# Patient Record
Sex: FEMALE | Race: WHITE | NOT HISPANIC OR LATINO | Employment: UNEMPLOYED | URBAN - METROPOLITAN AREA
[De-identification: names, ages, dates, MRNs, and addresses within clinical notes are randomized per-mention and may not be internally consistent; named-entity substitution may affect disease eponyms.]

---

## 2024-09-11 ENCOUNTER — HOSPITAL ENCOUNTER (EMERGENCY)
Facility: HOSPITAL | Age: 29
Discharge: HOME | End: 2024-09-11
Attending: EMERGENCY MEDICINE | Admitting: EMERGENCY MEDICINE
Payer: COMMERCIAL

## 2024-09-11 VITALS
TEMPERATURE: 98 F | DIASTOLIC BLOOD PRESSURE: 70 MMHG | HEART RATE: 88 BPM | SYSTOLIC BLOOD PRESSURE: 137 MMHG | OXYGEN SATURATION: 97 % | RESPIRATION RATE: 18 BRPM

## 2024-09-11 DIAGNOSIS — J06.9 VIRAL URI: Primary | ICD-10-CM

## 2024-09-11 PROCEDURE — 87804 INFLUENZA ASSAY W/OPTIC: CPT | Performed by: EMERGENCY MEDICINE

## 2024-09-11 PROCEDURE — 99283 EMERGENCY DEPT VISIT LOW MDM: CPT

## 2024-09-11 PROCEDURE — 99284 EMERGENCY DEPT VISIT MOD MDM: CPT | Performed by: EMERGENCY MEDICINE

## 2024-09-11 PROCEDURE — 87811 SARS-COV-2 COVID19 W/OPTIC: CPT | Performed by: EMERGENCY MEDICINE

## 2024-09-11 RX ORDER — IBUPROFEN 400 MG/1
800 TABLET, FILM COATED ORAL ONCE
Status: COMPLETED | OUTPATIENT
Start: 2024-09-11 | End: 2024-09-11

## 2024-09-11 RX ADMIN — IBUPROFEN 800 MG: 400 TABLET, FILM COATED ORAL at 23:42

## 2024-09-12 LAB
FLUAV AG UPPER RESP QL IA.RAPID: NEGATIVE
FLUBV AG UPPER RESP QL IA.RAPID: NEGATIVE
SARS-COV+SARS-COV-2 AG RESP QL IA.RAPID: NEGATIVE

## 2024-09-12 NOTE — ED PROVIDER NOTES
1. Viral URI      ED Disposition       ED Disposition   Discharge    Condition   Stable    Date/Time   Wed Sep 11, 2024 11:47 PM    Comment   Alena Ewing discharge to home/self care.                   Assessment & Plan       Medical Decision Making  Patient is a 21-year-old female presents for evaluation of COVID test.  Patient does not want any medication of that ibuprofen.  Patient clinically well satting in the high 90s.  Advise supportive measures for COVID.    Amount and/or Complexity of Data Reviewed  Labs: ordered.    Risk  Prescription drug management.                       Medications   ibuprofen (MOTRIN) tablet 800 mg (800 mg Oral Given 9/11/24 8016)       History of Present Illness       Patient is a 29-year-old female who presents for evaluation of COVID test.  Mom had COVID recently and patient started with symptoms yesterday.  She endorses a mild dull achy headache along with some rhinorrhea.  No significant cough.  No nausea vomiting chest pain dyspnea abdominal pain.  She has not take anything for her symptoms.            Review of Systems   Constitutional:  Negative for fever.   HENT:  Positive for rhinorrhea. Negative for sore throat.    Respiratory:  Negative for shortness of breath.    Cardiovascular:  Negative for chest pain.   Gastrointestinal:  Negative for abdominal pain.   Genitourinary:  Negative for dysuria.   Musculoskeletal:  Negative for back pain.   Skin:  Negative for rash.   Neurological:  Positive for headaches. Negative for light-headedness.   Psychiatric/Behavioral:  Negative for agitation.    All other systems reviewed and are negative.          Objective     ED Triage Vitals [09/11/24 2336]   Temperature Pulse Blood Pressure Respirations SpO2 Patient Position - Orthostatic VS   98 °F (36.7 °C) 88 137/70 18 97 % --      Temp src Heart Rate Source BP Location FiO2 (%) Pain Score    -- Monitor -- -- 3        Physical Exam  Vitals reviewed.   Constitutional:       General: She is  not in acute distress.     Appearance: She is well-developed.   HENT:      Head: Normocephalic.   Eyes:      Pupils: Pupils are equal, round, and reactive to light.   Cardiovascular:      Rate and Rhythm: Normal rate and regular rhythm.      Heart sounds: Normal heart sounds.   Pulmonary:      Effort: Pulmonary effort is normal.      Breath sounds: Normal breath sounds.   Abdominal:      General: Bowel sounds are normal. There is no distension.      Palpations: Abdomen is soft.      Tenderness: There is no abdominal tenderness. There is no guarding.   Musculoskeletal:         General: No tenderness or deformity. Normal range of motion.      Cervical back: Normal range of motion and neck supple.   Skin:     General: Skin is warm and dry.      Capillary Refill: Capillary refill takes less than 2 seconds.   Neurological:      Mental Status: She is alert and oriented to person, place, and time.      Cranial Nerves: No cranial nerve deficit.      Sensory: No sensory deficit.   Psychiatric:         Behavior: Behavior normal.         Thought Content: Thought content normal.         Judgment: Judgment normal.         Labs Reviewed   COVID-19/INFLUENZA A/B RAPID ANTIGEN (30 MIN.TAT)     No orders to display       Procedures       Mundo Zepeda MD  09/12/24 0004

## 2024-11-14 ENCOUNTER — HOSPITAL ENCOUNTER (EMERGENCY)
Facility: HOSPITAL | Age: 29
Discharge: HOME | End: 2024-11-14
Attending: EMERGENCY MEDICINE | Admitting: EMERGENCY MEDICINE
Payer: COMMERCIAL

## 2024-11-14 VITALS
SYSTOLIC BLOOD PRESSURE: 153 MMHG | BODY MASS INDEX: 36.1 KG/M2 | HEART RATE: 101 BPM | DIASTOLIC BLOOD PRESSURE: 87 MMHG | OXYGEN SATURATION: 98 % | WEIGHT: 224.6 LBS | RESPIRATION RATE: 18 BRPM | TEMPERATURE: 97.8 F | HEIGHT: 66 IN

## 2024-11-14 DIAGNOSIS — J32.9 SINUSITIS: Primary | ICD-10-CM

## 2024-11-14 DIAGNOSIS — J02.9 PHARYNGITIS: ICD-10-CM

## 2024-11-14 PROCEDURE — 99282 EMERGENCY DEPT VISIT SF MDM: CPT

## 2024-11-14 PROCEDURE — 99284 EMERGENCY DEPT VISIT MOD MDM: CPT | Performed by: EMERGENCY MEDICINE

## 2024-11-14 RX ORDER — ALBUTEROL SULFATE 90 UG/1
2 INHALANT RESPIRATORY (INHALATION) ONCE
Status: COMPLETED | OUTPATIENT
Start: 2024-11-14 | End: 2024-11-14

## 2024-11-14 RX ORDER — PSEUDOEPHEDRINE HCL 120 MG/1
120 TABLET, FILM COATED, EXTENDED RELEASE ORAL EVERY 12 HOURS PRN
Qty: 12 TABLET | Refills: 0 | Status: SHIPPED | OUTPATIENT
Start: 2024-11-14

## 2024-11-14 RX ADMIN — ALBUTEROL SULFATE 2 PUFF: 90 AEROSOL, METERED RESPIRATORY (INHALATION) at 09:14

## 2024-11-14 NOTE — ED PROVIDER NOTES
Time reflects when diagnosis was documented in both MDM as applicable and the Disposition within this note       Time User Action Codes Description Comment    11/14/2024  9:10 AM Coco Zelaya Add [J32.9] Sinusitis     11/14/2024  9:10 AM Coco Zelaya Add [J02.9] Pharyngitis           ED Disposition       ED Disposition   Discharge    Condition   Stable    Date/Time   Thu Nov 14, 2024  9:11 AM    Comment   Alena Ewing discharge to home/self care.                   Assessment & Plan       Medical Decision Making  29-year-old female presents to the ED with runny nose, congestion for the past 1 month.  Discussed that majority of sinus infections are viral however given the duration of her symptoms, will try a course of Augmentin.  Will prescribe Sudafed to be used as needed for nasal congestion.  I did offer giving first dose of medications in the ED however she has not eaten anything this morning and would prefer to eat something prior to taking the medication so we will send to pharmacy.  Will provide an inhaler for patient to go home with.  Lungs currently clear and she denies feeling short of breath or having chest tightness currently.  Advised her to use the inhaler as needed and to return immediately if any of her symptoms worsen especially her asthma.    Risk  OTC drugs.  Prescription drug management.             Medications   albuterol (PROVENTIL HFA,VENTOLIN HFA) inhaler 2 puff (2 puffs Inhalation Given 11/14/24 0914)       ED Risk Strat Scores                                               History of Present Illness       Chief Complaint   Patient presents with    Nasal Drainage    Sore Throat     Runny nose and congestion for the past month, began with sore throat 2 days ago. Seen at urgent care yesterday.        Past Medical History:   Diagnosis Date    Asthma     Disease of thyroid gland     GERD (gastroesophageal reflux disease)       History reviewed. No pertinent surgical history.   History  reviewed. No pertinent family history.   Social History     Tobacco Use    Smoking status: Never    Smokeless tobacco: Never   Vaping Use    Vaping status: Every Day   Substance Use Topics    Alcohol use: Not Currently    Drug use: No      E-Cigarette/Vaping    E-Cigarette Use Current Every Day User       E-Cigarette/Vaping Substances    Nicotine No     THC No     CBD No     Flavoring No     Other No     Unknown No       I have reviewed and agree with the history as documented.     Patient is a 29-year-old female with past medical history of hypothyroidism, asthma, presents to the emergency department for runny nose and congestion for the past 1 month.  Patient states she has been sick for 1 month with upper respiratory symptoms.  She states she alternates between rhinorrhea of yellow or white mucus and nasal congestion.  She has been using over-the-counter congestion medications with minimal relief.  She states yesterday her throat was hurting however after drinking tea with honey and lemon, it is improved.  She was also using throat lozenges.  She reports minimal dry cough and intermittent chest tightness but denies any shortness of breath or wheezing.  She denies any fevers or shaking chills.  She does report frontal headache that feels similar to sinus headaches.  She denies any earache, neck pain or stiffness, difficulty swallowing or handling secretions, chest pain, palpitations, abdominal pain, nausea, vomiting, diarrhea, urinary symptoms, skin rash or color change, focal neurologic deficits.  She states she was at urgent care yesterday and they swabbed her for COVID and flu which was negative.  She was prescribed promethazine however patient was not happy with the care she received from urgent care.      History provided by:  Patient   used: No    Sore Throat  Associated symptoms: cough, headaches and rhinorrhea    Associated symptoms: no abdominal pain, no adenopathy, no chest pain, no  chills, no ear discharge, no ear pain, no epistaxis, no fever, no neck stiffness, no rash, no shortness of breath, no trouble swallowing and no voice change        Review of Systems   Constitutional:  Negative for chills and fever.   HENT:  Positive for congestion, rhinorrhea, sinus pressure and sore throat. Negative for ear discharge, ear pain, nosebleeds, trouble swallowing and voice change.    Eyes:  Negative for photophobia, pain and visual disturbance.   Respiratory:  Positive for cough. Negative for chest tightness, shortness of breath and wheezing.    Cardiovascular:  Negative for chest pain and palpitations.   Gastrointestinal:  Negative for abdominal pain, diarrhea, nausea and vomiting.   Genitourinary:  Negative for dysuria, flank pain, frequency and hematuria.   Musculoskeletal:  Negative for back pain, neck pain and neck stiffness.   Skin:  Negative for color change, pallor, rash and wound.   Allergic/Immunologic: Negative for immunocompromised state.   Neurological:  Positive for headaches. Negative for dizziness, syncope, weakness, light-headedness and numbness.   Hematological:  Negative for adenopathy.   Psychiatric/Behavioral:  Negative for confusion and decreased concentration.    All other systems reviewed and are negative.          Objective       ED Triage Vitals [11/14/24 0850]   Temperature Pulse Blood Pressure Respirations SpO2 Patient Position - Orthostatic VS   97.8 °F (36.6 °C) 101 153/87 18 98 % Sitting      Temp Source Heart Rate Source BP Location FiO2 (%) Pain Score    Oral Monitor Left arm -- --      Vitals      Date and Time Temp Pulse SpO2 Resp BP Pain Score FACES Pain Rating User   11/14/24 0850 97.8 °F (36.6 °C) 101 98 % 18 153/87 -- -- LA            Physical Exam  Vitals and nursing note reviewed.   Constitutional:       General: She is not in acute distress.     Appearance: Normal appearance. She is well-developed. She is not ill-appearing, toxic-appearing or diaphoretic.    HENT:      Head: Normocephalic and atraumatic.      Right Ear: Ear canal and external ear normal. There is no impacted cerumen.      Left Ear: Ear canal and external ear normal. There is no impacted cerumen.      Ears:      Comments: Bilateral ear effusion.  No TM erythema or bulging.     Nose: Congestion present.      Comments: Bilateral nasal turbinate erythema and edema.     Mouth/Throat:      Mouth: Mucous membranes are moist.      Pharynx: Oropharynx is clear. Posterior oropharyngeal erythema present. No oropharyngeal exudate.   Eyes:      Extraocular Movements: Extraocular movements intact.      Conjunctiva/sclera: Conjunctivae normal.      Pupils: Pupils are equal, round, and reactive to light.   Neck:      Vascular: No JVD.   Cardiovascular:      Rate and Rhythm: Normal rate and regular rhythm.      Pulses: Normal pulses.      Heart sounds: Normal heart sounds. No murmur heard.     No friction rub. No gallop.   Pulmonary:      Effort: Pulmonary effort is normal. No respiratory distress.      Breath sounds: Normal breath sounds. No wheezing, rhonchi or rales.   Abdominal:      General: There is no distension.      Palpations: Abdomen is soft.      Tenderness: There is no abdominal tenderness. There is no guarding or rebound.   Musculoskeletal:         General: No swelling or tenderness. Normal range of motion.      Cervical back: Normal range of motion and neck supple. No rigidity.   Lymphadenopathy:      Cervical: No cervical adenopathy.   Skin:     General: Skin is warm and dry.      Coloration: Skin is not pale.      Findings: No erythema or rash.   Neurological:      General: No focal deficit present.      Mental Status: She is alert and oriented to person, place, and time.      Sensory: No sensory deficit.      Motor: No weakness.   Psychiatric:         Behavior: Behavior normal.         Results Reviewed       None            No orders to display       Procedures    ED Medication and Procedure  Management   Prior to Admission Medications   Prescriptions Last Dose Informant Patient Reported? Taking?   albuterol (PROVENTIL HFA,VENTOLIN HFA) 90 mcg/act inhaler   No No   Sig: Inhale 2 puffs 4 (four) times a day as needed for wheezing   benzonatate (TESSALON) 200 MG capsule   No No   Sig: Take 1 capsule (200 mg total) by mouth 3 (three) times a day as needed for cough   Patient not taking: Reported on 5/16/2024   ibuprofen (MOTRIN) 600 mg tablet   No No   Sig: Take 1 tablet (600 mg total) by mouth every 6 (six) hours as needed for mild pain or moderate pain   levothyroxine 50 mcg tablet   Yes No   Sig: Take 50 mcg by mouth daily   methylPREDNISolone 4 MG tablet therapy pack   No No   Sig: Use as directed on package   Patient not taking: Reported on 5/16/2024   metroNIDAZOLE (METROGEL) 0.75 % vaginal gel   Yes No   Sig: INSERT 1 APPLICATORFUL INTO THE VAGINA 2 TIMES A DAY FOR 5 DAYS.   Patient not taking: Reported on 5/16/2024      Facility-Administered Medications: None     Discharge Medication List as of 11/14/2024  9:13 AM        START taking these medications    Details   amoxicillin-clavulanate (AUGMENTIN) 875-125 mg per tablet Take 1 tablet by mouth every 12 (twelve) hours for 7 days, Starting Thu 11/14/2024, Until Thu 11/21/2024, Normal      pseudoephedrine (SUDAFED) 120 MG 12 hr tablet Take 1 tablet (120 mg total) by mouth every 12 (twelve) hours as needed for congestion, Starting Thu 11/14/2024, Normal           CONTINUE these medications which have NOT CHANGED    Details   albuterol (PROVENTIL HFA,VENTOLIN HFA) 90 mcg/act inhaler Inhale 2 puffs 4 (four) times a day as needed for wheezing, Starting Sun 1/28/2024, Normal      benzonatate (TESSALON) 200 MG capsule Take 1 capsule (200 mg total) by mouth 3 (three) times a day as needed for cough, Starting Mon 9/18/2023, Normal      ibuprofen (MOTRIN) 600 mg tablet Take 1 tablet (600 mg total) by mouth every 6 (six) hours as needed for mild pain or moderate  pain, Starting Mon 3/25/2024, Normal      levothyroxine 50 mcg tablet Take 50 mcg by mouth daily, Starting Thu 4/13/2023, Until Thu 5/16/2024, Historical Med      methylPREDNISolone 4 MG tablet therapy pack Use as directed on package, Normal      metroNIDAZOLE (METROGEL) 0.75 % vaginal gel INSERT 1 APPLICATORFUL INTO THE VAGINA 2 TIMES A DAY FOR 5 DAYS., Historical Med           No discharge procedures on file.  ED SEPSIS DOCUMENTATION   Time reflects when diagnosis was documented in both MDM as applicable and the Disposition within this note       Time User Action Codes Description Comment    11/14/2024  9:10 AM Coco Zelaya [J32.9] Sinusitis     11/14/2024  9:10 AM Coco Zelaya [J02.9] Pharyngitis                  Coco Zelaya DO  11/14/24 0919

## 2025-06-12 ENCOUNTER — TELEPHONE (OUTPATIENT)
Dept: FAMILY MEDICINE CLINIC | Facility: CLINIC | Age: 30
End: 2025-06-12

## 2025-06-12 NOTE — TELEPHONE ENCOUNTER
Please remove PCP from pt's chart because pt is now going to [facility]. Thank You.

## 2025-06-18 ENCOUNTER — APPOINTMENT (EMERGENCY)
Dept: CT IMAGING | Facility: HOSPITAL | Age: 30
End: 2025-06-18
Payer: COMMERCIAL

## 2025-06-18 ENCOUNTER — HOSPITAL ENCOUNTER (EMERGENCY)
Facility: HOSPITAL | Age: 30
Discharge: HOME | End: 2025-06-18
Attending: EMERGENCY MEDICINE
Payer: COMMERCIAL

## 2025-06-18 VITALS
WEIGHT: 206 LBS | HEIGHT: 66 IN | DIASTOLIC BLOOD PRESSURE: 82 MMHG | OXYGEN SATURATION: 100 % | SYSTOLIC BLOOD PRESSURE: 138 MMHG | TEMPERATURE: 97.9 F | RESPIRATION RATE: 18 BRPM | BODY MASS INDEX: 33.11 KG/M2 | HEART RATE: 99 BPM

## 2025-06-18 DIAGNOSIS — R42 DIZZINESS: Primary | ICD-10-CM

## 2025-06-18 LAB
ANION GAP SERPL CALCULATED.3IONS-SCNC: 7 MMOL/L (ref 4–13)
BASOPHILS # BLD AUTO: 0.05 THOUSANDS/ÂΜL (ref 0–0.1)
BASOPHILS NFR BLD AUTO: 1 % (ref 0–1)
BUN SERPL-MCNC: 10 MG/DL (ref 5–25)
CALCIUM SERPL-MCNC: 9.5 MG/DL (ref 8.4–10.2)
CHLORIDE SERPL-SCNC: 105 MMOL/L (ref 96–108)
CO2 SERPL-SCNC: 25 MMOL/L (ref 21–32)
CREAT SERPL-MCNC: 0.65 MG/DL (ref 0.6–1.3)
EOSINOPHIL # BLD AUTO: 0.05 THOUSAND/ÂΜL (ref 0–0.61)
EOSINOPHIL NFR BLD AUTO: 1 % (ref 0–6)
ERYTHROCYTE [DISTWIDTH] IN BLOOD BY AUTOMATED COUNT: 10.9 % (ref 11.6–15.1)
GFR SERPL CREATININE-BSD FRML MDRD: 120 ML/MIN/1.73SQ M
GLUCOSE SERPL-MCNC: 98 MG/DL (ref 65–140)
HCT VFR BLD AUTO: 39.5 % (ref 34.8–46.1)
HGB BLD-MCNC: 14.1 G/DL (ref 11.5–15.4)
IMM GRANULOCYTES # BLD AUTO: 0.01 THOUSAND/UL (ref 0–0.2)
IMM GRANULOCYTES NFR BLD AUTO: 0 % (ref 0–2)
LYMPHOCYTES # BLD AUTO: 1.89 THOUSANDS/ÂΜL (ref 0.6–4.47)
LYMPHOCYTES NFR BLD AUTO: 27 % (ref 14–44)
MAGNESIUM SERPL-MCNC: 2 MG/DL (ref 1.9–2.7)
MCH RBC QN AUTO: 33.5 PG (ref 26.8–34.3)
MCHC RBC AUTO-ENTMCNC: 35.7 G/DL (ref 31.4–37.4)
MCV RBC AUTO: 94 FL (ref 82–98)
MONOCYTES # BLD AUTO: 0.47 THOUSAND/ÂΜL (ref 0.17–1.22)
MONOCYTES NFR BLD AUTO: 7 % (ref 4–12)
NEUTROPHILS # BLD AUTO: 4.42 THOUSANDS/ÂΜL (ref 1.85–7.62)
NEUTS SEG NFR BLD AUTO: 64 % (ref 43–75)
NRBC BLD AUTO-RTO: 0 /100 WBCS
PLATELET # BLD AUTO: 431 THOUSANDS/UL (ref 149–390)
PMV BLD AUTO: 10.1 FL (ref 8.9–12.7)
POTASSIUM SERPL-SCNC: 3.8 MMOL/L (ref 3.5–5.3)
RBC # BLD AUTO: 4.21 MILLION/UL (ref 3.81–5.12)
SODIUM SERPL-SCNC: 137 MMOL/L (ref 135–147)
WBC # BLD AUTO: 6.89 THOUSAND/UL (ref 4.31–10.16)

## 2025-06-18 PROCEDURE — 99284 EMERGENCY DEPT VISIT MOD MDM: CPT

## 2025-06-18 PROCEDURE — 99285 EMERGENCY DEPT VISIT HI MDM: CPT | Performed by: FAMILY MEDICINE

## 2025-06-18 PROCEDURE — 85025 COMPLETE CBC W/AUTO DIFF WBC: CPT | Performed by: FAMILY MEDICINE

## 2025-06-18 PROCEDURE — 80048 BASIC METABOLIC PNL TOTAL CA: CPT | Performed by: FAMILY MEDICINE

## 2025-06-18 PROCEDURE — 36415 COLL VENOUS BLD VENIPUNCTURE: CPT | Performed by: FAMILY MEDICINE

## 2025-06-18 PROCEDURE — 70450 CT HEAD/BRAIN W/O DYE: CPT

## 2025-06-18 PROCEDURE — 83735 ASSAY OF MAGNESIUM: CPT | Performed by: FAMILY MEDICINE

## 2025-06-18 RX ORDER — MECLIZINE HYDROCHLORIDE 25 MG/1
25 TABLET ORAL 3 TIMES DAILY PRN
Qty: 30 TABLET | Refills: 0 | Status: SHIPPED | OUTPATIENT
Start: 2025-06-18

## 2025-06-18 RX ORDER — MECLIZINE HCL 12.5 MG 12.5 MG/1
25 TABLET ORAL ONCE
Status: DISCONTINUED | OUTPATIENT
Start: 2025-06-18 | End: 2025-06-18 | Stop reason: HOSPADM

## 2025-06-18 NOTE — ED PROVIDER NOTES
"Time reflects when diagnosis was documented in both MDM as applicable and the Disposition within this note       Time User Action Codes Description Comment    6/18/2025  2:15 PM Tray Joiner Add [R42] Dizziness           ED Disposition       ED Disposition   Discharge    Condition   Stable    Date/Time   Wed Jun 18, 2025  2:15 PM    Comment   Alena Ewing discharge to home/self care.                   Assessment & Plan       Medical Decision Making  Amount and/or Complexity of Data Reviewed  Labs: ordered.  Radiology: ordered.    30-year-old female with history of vertigo has been under the care of of physical therapy presented to ED with the complaint of 2 episode of vertigo-like symptoms.  Patient stated her initial episode was on Sunday and then this morning while he was driving.  Patient states she pulled over lasted for few seconds and then resolved.  Patient denies any nausea vomiting.  She states that this does feel like her vertigo.  She states she did not finish her physical therapy for vertigo and started a new job.  Denies any headache blurry vision double vision.  Denies chest pain shortness of breath otherwise stable awake alert oriented GCS 15    Differential diagnoses rule out vertigo/TIA/stroke/electro abnormality/arrhythmia/renal failure/dehydration/pregnancy  Plan will obtain labs CT  I had a detailed and multiple discussion with patient and significant other is at bedside regarding the importance of getting labs and further imaging to rule out any other causes of vertigo after multiple attempts patient finally agreed for CT and lab values however able to provide urine  Labs reviewed within normal limits  CT reviewed within normal limits  Patient refused Antivert and the hospitalist states \"I am weird about taking medication\".  Disposition :   Patient presented with a chief complaint of dizziness that I estimate an extremely low likelihood of a central cause. The patient does not have any " pathologic rotary or vertical nystagmus on exam.  The patient has no focal motor or sensory deficit.  There is no ataxia on exam and the patient's gait and coordination is at their baseline.  The patient was informed of the risk of diagnostic uncertainty including but not limited to central cause such as cerebellar infarct. The patient was offered admission for further observation, monitoring and treatment other symptoms however they politely declined stating that they felt well enough to go home and follow-up with their PCP.  The patient is very well appearing at time of discharge.  Patient is given referral for ENT neurology does have referral for PT OT recommended continue with medication as needed.  All the patient's questions were answered, they were agreeable to plan and very thankful for care.          Medications   meclizine (ANTIVERT) tablet 25 mg (25 mg Oral Not Given 6/18/25 1346)       ED Risk Strat Scores                    No data recorded        SBIRT 20yo+      Flowsheet Row Most Recent Value   Initial Alcohol Screen: US AUDIT-C     1. How often do you have a drink containing alcohol? 0 Filed at: 06/18/2025 1238   2. How many drinks containing alcohol do you have on a typical day you are drinking?  0 Filed at: 06/18/2025 1235   3a. Male UNDER 65: How often do you have five or more drinks on one occasion? 0 Filed at: 06/18/2025 1235   3b. FEMALE Any Age, or MALE 65+: How often do you have 4 or more drinks on one occassion? 0 Filed at: 06/18/2025 1235   Audit-C Score 0 Filed at: 06/18/2025 1235   FRANCOISE: How many times in the past year have you...    Used an illegal drug or used a prescription medication for non-medical reasons? Never Filed at: 06/18/2025 1235                            History of Present Illness       Chief Complaint   Patient presents with    Dizziness     Hx vertigo, patient reports she has been having dizziness with driving and patient is a . Denies HA and  nausea  Patient refusing iv        Past Medical History[1]   Past Surgical History[2]   Family History[3]   Social History[4]   E-Cigarette/Vaping    E-Cigarette Use Current Every Day User       E-Cigarette/Vaping Substances    Nicotine No     THC No     CBD No     Flavoring No     Other No     Unknown No       I have reviewed and agree with the history as documented.       Dizziness  Associated symptoms: no chest pain, no diarrhea, no headaches, no nausea, no shortness of breath and no vomiting        Review of Systems   Constitutional:  Negative for chills and fever.   HENT:  Negative for rhinorrhea and sore throat.    Eyes:  Negative for visual disturbance.   Respiratory:  Negative for cough and shortness of breath.    Cardiovascular:  Negative for chest pain and leg swelling.   Gastrointestinal:  Negative for abdominal pain, diarrhea, nausea and vomiting.   Genitourinary:  Negative for dysuria.   Musculoskeletal:  Negative for back pain and myalgias.   Skin:  Negative for rash.   Neurological:  Positive for dizziness. Negative for headaches.   Psychiatric/Behavioral:  Negative for confusion.    All other systems reviewed and are negative.          Objective       ED Triage Vitals   Temperature Pulse Blood Pressure Respirations SpO2 Patient Position - Orthostatic VS   06/18/25 1236 06/18/25 1236 06/18/25 1236 06/18/25 1235 06/18/25 1236 06/18/25 1235   97.9 °F (36.6 °C) 97 124/84 18 99 % Sitting      Temp Source Heart Rate Source BP Location FiO2 (%) Pain Score    06/18/25 1235 06/18/25 1235 06/18/25 1235 -- 06/18/25 1236    Temporal Monitor Left arm  No Pain      Vitals      Date and Time Temp Pulse SpO2 Resp BP Pain Score FACES Pain Rating User   06/18/25 1423 -- 99 100 % 18 138/82 -- -- [facility] [place]   06/18/25 1236 97.9 °F (36.6 °C) 97 99 % 18 124/84 No Pain -- EM   06/18/25 1235 -- -- -- 18 -- -- -- EM            Physical Exam  Vitals and nursing note reviewed.   Constitutional:       Appearance: She is  well-developed.   HENT:      Head: Normocephalic and atraumatic.      Right Ear: External ear normal.      Left Ear: External ear normal.      Nose: Nose normal.      Mouth/Throat:      Mouth: Mucous membranes are moist.      Pharynx: No oropharyngeal exudate.     Eyes:      General: No scleral icterus.        Right eye: No discharge.         Left eye: No discharge.      Conjunctiva/sclera: Conjunctivae normal.      Pupils: Pupils are equal, round, and reactive to light.       Cardiovascular:      Rate and Rhythm: Normal rate and regular rhythm.      Pulses: Normal pulses.      Heart sounds: Normal heart sounds.   Pulmonary:      Effort: Pulmonary effort is normal. No respiratory distress.      Breath sounds: Normal breath sounds. No wheezing.   Abdominal:      General: Bowel sounds are normal.      Palpations: Abdomen is soft.     Musculoskeletal:         General: Normal range of motion.      Cervical back: Normal range of motion and neck supple.   Lymphadenopathy:      Cervical: No cervical adenopathy.     Skin:     General: Skin is warm and dry.      Capillary Refill: Capillary refill takes less than 2 seconds.     Neurological:      General: No focal deficit present.      Mental Status: She is alert and oriented to person, place, and time.     Psychiatric:         Mood and Affect: Mood normal.         Behavior: Behavior normal.         Results Reviewed       Procedure Component Value Units Date/Time    Basic metabolic panel [[id number]] Collected: 06/18/25 1341    Lab Status: Final result Specimen: Blood from Arm, Right Updated: 06/18/25 1403     Sodium 137 mmol/L      Potassium 3.8 mmol/L      Chloride 105 mmol/L      CO2 25 mmol/L      ANION GAP 7 mmol/L      BUN 10 mg/dL      Creatinine 0.65 mg/dL      Glucose 98 mg/dL      Calcium 9.5 mg/dL      eGFR 120 ml/min/1.73sq m     Narrative:      National Kid[employer] Disease Foundation guidelines for Chronic Kidney Disease (CKD):     Stage 1 with normal or high GFR (GFR  > 90 mL/min/1.73 square meters)    Stage 2 Mild CKD (GFR = 60-89 mL/min/1.73 square meters)    Stage 3A Moderate CKD (GFR = 45-59 mL/min/1.73 square meters)    Stage 3B Moderate CKD (GFR = 30-44 mL/min/1.73 square meters)    Stage 4 Severe CKD (GFR = 15-29 mL/min/1.73 square meters)    Stage 5 End Stage CKD (GFR <15 mL/min/1.73 square meters)  Note: GFR calculation is accurate only with a steady state creatinine    Magnesium [[id number]]  (Normal) Collected: 06/18/25 1341    Lab Status: Final result Specimen: Blood from Arm, Right Updated: 06/18/25 1403     Magnesium 2.0 mg/dL     CBC and differential [[id number]]  (Abnormal) Collected: 06/18/25 1341    Lab Status: Final result Specimen: Blood from Arm, Right Updated: 06/18/25 1350     WBC 6.89 Thousand/uL      RBC 4.21 Million/uL      Hemoglobin 14.1 g/dL      Hematocrit 39.5 %      MCV 94 fL      MCH 33.5 pg      MCHC 35.7 g/dL      RDW 10.9 %      MPV 10.1 fL      Platelets 431 Thousands/uL      nRBC 0 /100 WBCs      Segmented % 64 %      Immature Grans % 0 %      Lymphocytes % 27 %      Monocytes % 7 %      Eosinophils Relative 1 %      Basophils Relative 1 %      Absolute Neutrophils 4.42 Thousands/µL      Absolute Immature Grans 0.01 Thousand/uL      Absolute Lymphocytes 1.89 Thousands/µL      Absolute Monocytes 0.47 Thousand/µL      Eosinophils Absolute 0.05 Thousand/µL      Basophils Absolute 0.05 Thousands/µL     UA w Reflex to Microscopic w Reflex to Culture [[id number]]     Lab Status: No result Specimen: Urine     POCT pregnancy, urine [[id number]]     Lab Status: No result             CT head without contrast   Final Interpretation by Pallav N Shah, MD (06/18 1347)      No acute intracranial or extra-axial hemorrhage, mass effect, or collection. No evidence of acute infarction.                  Resident: Nasreen Souza I, the attending radiologist, have reviewed the images and agree with the final report above.      Workstation performed: [id number]              Procedures    ED Medication and Procedure Management   Prior to Admission Medications   Prescriptions Last Dose Informant Patient Reported? Taking?   albuterol (PROVENTIL HFA,VENTOLIN HFA) 90 mcg/act inhaler   No No   Sig: Inhale 2 puffs 4 (four) times a day as needed for wheezing   benzonatate (TESSALON) 200 MG capsule   No No   Sig: Take 1 capsule (200 mg total) by mouth 3 (three) times a day as needed for cough   Patient not taking: Reported on 5/16/2024   ibuprofen (MOTRIN) 600 mg tablet   No No   Sig: Take 1 tablet (600 mg total) by mouth every 6 (six) hours as needed for mild pain or moderate pain   levothyroxine 50 mcg tablet   Yes No   Sig: Take 50 mcg by mouth daily   methylPREDNISolone 4 MG tablet therapy pack   No No   Sig: Use as directed on package   Patient not taking: Reported on 5/16/2024   metroNIDAZOLE (METROGEL) 0.75 % vaginal gel   Yes No   Sig: INSERT 1 APPLICATORFUL INTO THE VAGINA 2 TIMES A DAY FOR 5 DAYS.   Patient not taking: Reported on 5/16/2024   pseudoephedrine (SUDAFED) 120 MG 12 hr tablet   No No   Sig: Take 1 tablet (120 mg total) by mouth every 12 (twelve) hours as needed for congestion      Facility-Administered Medications: None     Patient's Medications   Discharge Prescriptions    MECLIZINE (ANTIVERT) 25 MG TABLET    Take 1 tablet (25 mg total) by mouth 3 (three) times a day as needed for dizziness       Start Date: 6/18/2025 End Date: --       Order Dose: 25 mg       Quantity: 30 tablet    Refills: 0       ED SEPSIS DOCUMENTATION   Time reflects when diagnosis was documented in both MDM as applicable and the Disposition within this note       Time User Action Codes Description Comment    6/18/2025  2:15 PM Tray Joiner Add [R42] Dizziness                      [1]   Past Medical History:  Diagnosis Date    Asthma     Disease of thyroid gland     GERD (gastroesophageal reflux disease)    [2] No past surgical history on file.  [3] No family history on file.  [4]   Social  History  Tobacco Use    Smoking status: Never    Smokeless tobacco: Never   Vaping Use    Vaping status: Every Day   Substance Use Topics    Alcohol use: Not Currently    Drug use: No        Tray Joiner MD  06/18/25 9084

## 2025-06-18 NOTE — DISCHARGE INSTRUCTIONS
I had a detailed discussion with the patient and/or guardian regarding: the historical points, exam findings, and any diagnostic results supporting the discharge diagnosis, lab results, radiology results, discharge instructions reviewed with patient and/or family/caregiver and understanding was verbalized. Instructions given to return to the emergency department if symptoms worsen or persist, or if there are any questions or concerns that arise at home.

## 2025-06-18 NOTE — Clinical Note
Alena Ewing was seen and treated in our emergency department on 6/18/2025.                Diagnosis:     Alena  may return to work on return date.    She may return on this date: 06/21/2025         If you have any questions or concerns, please don't hesitate to call.      Tray Joiner MD    ______________________________           _______________          _______________  Hospital Representative                              Date                                Time

## 2025-06-19 ENCOUNTER — TELEPHONE (OUTPATIENT)
Age: 30
End: 2025-06-19

## 2025-06-19 NOTE — TELEPHONE ENCOUNTER
Patient called to schedule a STAT appt from referral. Provided [facility] in [place] and [place] to call for appt

## 2025-06-25 ENCOUNTER — OFFICE VISIT (OUTPATIENT)
Dept: NEUROLOGY | Facility: CLINIC | Age: 30
End: 2025-06-25
Payer: COMMERCIAL

## 2025-06-25 VITALS
HEIGHT: 66 IN | OXYGEN SATURATION: 99 % | WEIGHT: 204 LBS | HEART RATE: 74 BPM | SYSTOLIC BLOOD PRESSURE: 116 MMHG | BODY MASS INDEX: 32.78 KG/M2 | DIASTOLIC BLOOD PRESSURE: 88 MMHG

## 2025-06-25 DIAGNOSIS — R42 DIZZINESS: Primary | ICD-10-CM

## 2025-06-25 DIAGNOSIS — G43.909 MIGRAINE: ICD-10-CM

## 2025-06-25 PROCEDURE — 99245 OFF/OP CONSLTJ NEW/EST HI 55: CPT | Performed by: STUDENT IN AN ORGANIZED HEALTH CARE EDUCATION/TRAINING PROGRAM

## 2025-06-25 PROCEDURE — 99417 PROLNG OP E/M EACH 15 MIN: CPT | Performed by: STUDENT IN AN ORGANIZED HEALTH CARE EDUCATION/TRAINING PROGRAM

## 2025-06-25 RX ORDER — SEMAGLUTIDE 1 MG/.5ML
1 INJECTION, SOLUTION SUBCUTANEOUS WEEKLY
COMMUNITY
Start: 2025-06-12

## 2025-06-25 RX ORDER — MAGNESIUM OXIDE 400 MG/1
400 TABLET ORAL DAILY
Qty: 30 TABLET | Refills: 5 | Status: SHIPPED | OUTPATIENT
Start: 2025-06-25 | End: 2025-12-22

## 2025-06-25 NOTE — PROGRESS NOTES
Neurology Ambulatory Visit  Name: Alena Ewing       : 1995       MRN: [id number]   Encounter Provider: Kya Pritchard MD   Encounter Date: 2025  Encounter department: [facility]    Alena Ewing is a 30 y.o. female with PMH of asthma and hypothyroidism who presents as initial consultation for dizziness.     She feels episodic lightheadedness that lasts seconds, without nausea, is not clearly positional and tends to happen most commonly in the car. It is unclear to me whether this is true vertigo, but she does describe a sensation of spinning. She has not tried meclizine yet. She could not tolerate vestibular therapy due to worsening dizziness. On her exam today, she has no nystagmus, but has a positive head impulse test on both sides, so I would like her to be evaluated by ENT in case she needs further work-up like VNG.     It is possible that this is vertiginous migraine since she does have daily migraine headaches. I have counseled her on lifestyle interventions and will start her on magnesium and screen for sleep apnea.   Assessment & Plan  Dizziness  - Referral to ENT already placed  Orders:    Ambulatory Referral to Neurology    Ambulatory Referral to Sleep Medicine; Future    Migraine  - start magnesium oxide 400mg daily  - counseled on lifestyle measure like adequate hydration and keeping a headache journal  - referral to sleep medicine to rule out sleep apnea   - f/u with ophthalmology for yearly fundoscopic exam   Orders:    Ambulatory Referral to Sleep Medicine; Future    magnesium oxide (MAG-OX) 400 mg tablet; Take 1 tablet (400 mg total) by mouth daily    RTC 3 mo      HISTORY OF PRESENT ILLNESS    Per my chart review:   She recently presented to the emergency room with vertigo on 25. She was previously attending physical therapy who suspected vestibular hypofunction with possible cervicogenic component.     Per patient:  In , she began to  "experience lightheadedness and dizziness. It was happening every other day, and it would feel like her \"brain was spinning\" not so much that the room is spinning. It lasts for seconds. She does not have nausea with it. She thinks that motion triggers it, for instance making a turn in the car. She also feels like sitting up too fast tends to trigger it. She has ear ringing every now and then. She has headaches every day, sometimes the dizziness is associated with this.     She feels the headaches in the bitemporal area, can be a 6-10/10, she has photophobia, no nausea. Activity makes it worse and she usually feels like she needs to lay down in a dark room. She cannot remember exactly when it started. She takes ibuprofen every other day. She takes 600-800mg every other day and it works well.     She drinks 1 bottle of water a day. She drinks coffee occasionally, ice tea sometimes. She sleeps 8 hours at night, her  tells her that she snores. Sometimes she doesn't sleep till 1 or 2 AM, she doesn't feel tired. She feels tired during the day frequently.     She reports being asymptomatic right now. She has been diagnosed with bipolar disorder before, her mood still fluctuates. She does not have a psychiatrist.     She tried physical therapy only for 1 day, but she didn't like the way it made her feel, it made her more nauseous and dizzy. She was prescribed meclizine but she hasn't tried that yet.     Prior Work-up:   CT Head 6/18/25: personally reviewed, normal parenchyma    Past Medical History:    Past Medical History[1]  Past Surgical History[2]    Family History:  Family History[3]    Social History:  Social History[4]       Allergies:  Allergies[5]    Medications:  Current Medications[6]      OBJECTIVE  /88 (BP Location: Left arm, Patient Position: Standing, Cuff Size: Large)   Pulse 74   Ht 5' 6\" (1.676 m)   Wt 92.5 kg (204 lb)   SpO2 99%   BMI 32.93 kg/m²    Orthostatics negative    Labs  I have " reviewed pertinent labs:  CBC:   Lab Results   Component Value Date    WBC 6.89 06/18/2025    RBC 4.21 06/18/2025    HGB 14.1 06/18/2025    HCT 39.5 06/18/2025    MCV 94 06/18/2025     (H) 06/18/2025    MCH 33.5 06/18/2025    MCHC 35.7 06/18/2025    RDW 10.9 (L) 06/18/2025    MPV 10.1 06/18/2025    NEUTROABS 4.42 06/18/2025     CMP:   Lab Results   Component Value Date    SODIUM 137 06/18/2025    K 3.8 06/18/2025     06/18/2025    CO2 25 06/18/2025    AGAP 7 06/18/2025    BUN 10 06/18/2025    CREATININE 0.65 06/18/2025    GLUC 98 06/18/2025    CALCIUM 9.5 06/18/2025    AST 21 06/05/2024    AST 21 06/05/2024    ALT 32 06/05/2024    ALT 32 06/05/2024    ALKPHOS 89 06/05/2024    ALKPHOS 89 06/05/2024    TP 7.0 06/05/2024    TP 7.0 06/05/2024    ALB 4.3 06/05/2024    ALB 4.3 06/05/2024    TBILI 0.8 06/05/2024    TBILI 0.8 06/05/2024    EGFR 120 06/18/2025       Lab Results   Component Value Date/Time    PGCHMMRW80 577 12/05/2024 09:48 AM    TSH 2.57 06/10/2025 10:01 AM    ZRX7RTLEFYBV 3.390 01/13/2020 07:35 AM    FREET4 1.05 07/10/2024 09:05 AM    HGBA1C 5.4 12/05/2024 09:48 AM    CRP 7.5 12/13/2024 09:08 AM            General Exam  GENERAL APPEARANCE:  No distress, alert, interactive and cooperative.  CARDIOVASCULAR: Warm and well perfused  LUNGS: normal work of breathing on room air  EXTREMITIES: no peripheral edema     Neurologic Exam  MENTAL STATE:  Orientation was normal to time, place and person. Recent and remote memory was intact.  Attention span and concentration were normal. Language testing was normal for comprehension, repetition, expression, and naming.      CRANIAL NERVES:  CN 2       Optic discs were not well visualized due to miosis.   CN 3, 4, 6  Pupils round, 4 mm in diameter, equally reactive to light. Lids symmetric; no ptosis. EOMs normal alignment, full range. No nystagmus.  CN 5  Facial sensation intact bilaterally.  CN 7  Normal and symmetric facial strength.   CN 8  Hearing intact  to conversation.  CN 9  Palate elevates symmetrically.  CN 11  Normal strength of shoulder shrug and neck turning.  CN 12  Tongue midline, with normal bulk and strength.    HINTS exam: Negative Faye Hallpike, Corrective saccades with head impulse on both sides, no skew, VOR is not smooth.      MOTOR:  Muscle bulk and tone were normal in both upper and lower extremities. Muscle strength was 5/5 in distal and proximal muscles in both upper and lower extremities. No fasciculations, tremor or other abnormal movements were present.     REFLEXES:  RIGHT UE   LEFT UE  Biceps:1      Biceps:1       RIGHT LLE   LEFT LLE    Knee:1           Knee:1    Ankle:1         Ankle:1    No clonus.     SENSORY:  In both upper and lower extremities, sensation was intact to light touch.     COORDINATION:   In both upper extremities, finger-nose-finger was intact without dysmetria or overshoot. In both lower extremities, heel-to-shin was intact.     GAIT:  Station was stable with a normal base. Gait was stable with a normal arm swing and speed. Tandem gait was intact. No Romberg sign was present.    Administrative Statements   The total amount of time spent with the patient and on chart review and documentation was 70 minutes. Issues addressed during this visit included counseling on diagnosis and prognosis, counseling on medical management, counseling on medication side effects, counseling on lifestyle, and discussion of exam findings.          [1]   Past Medical History:  Diagnosis Date    Asthma     Disease of thyroid gland     GERD (gastroesophageal reflux disease)    [2] No past surgical history on file.  [3] No family history on file.  [4]   Social History  Tobacco Use    Smoking status: Never    Smokeless tobacco: Never   Vaping Use    Vaping status: Every Day   Substance Use Topics    Alcohol use: Not Currently    Drug use: No   [5] No Known Allergies  [6]   Current Outpatient Medications:     albuterol (PROVENTIL HFA,VENTOLIN HFA)  90 mcg/act inhaler, Inhale 2 puffs 4 (four) times a day as needed for wheezing, Disp: 18 g, Rfl: 0    ibuprofen (MOTRIN) 600 mg tablet, Take 1 tablet (600 mg total) by mouth every 6 (six) hours as needed for mild pain or moderate pain, Disp: 30 tablet, Rfl: 0    levothyroxine 50 mcg tablet, Take 50 mcg by mouth in the morning., Disp: , Rfl:     meclizine (ANTIVERT) 25 mg tablet, Take 1 tablet (25 mg total) by mouth 3 (three) times a day as needed for dizziness, Disp: 30 tablet, Rfl: 0    pseudoephedrine (SUDAFED) 120 MG 12 hr tablet, Take 1 tablet (120 mg total) by mouth every 12 (twelve) hours as needed for congestion, Disp: 12 tablet, Rfl: 0    Wegovy 1 MG/0.5ML, Inject 1 mg under the skin Once a week, Disp: , Rfl:     benzonatate (TESSALON) 200 MG capsule, Take 1 capsule (200 mg total) by mouth 3 (three) times a day as needed for cough (Patient not taking: Reported on 5/16/2024), Disp: 20 capsule, Rfl: 0    methylPREDNISolone 4 MG tablet therapy pack, Use as directed on package (Patient not taking: Reported on 5/16/2024), Disp: 21 tablet, Rfl: 0    metroNIDAZOLE (METROGEL) 0.75 % vaginal gel, INSERT 1 APPLICATORFUL INTO THE VAGINA 2 TIMES A DAY FOR 5 DAYS. (Patient not taking: Reported on 5/16/2024), Disp: , Rfl:

## 2025-07-08 ENCOUNTER — DOCUMENTATION (OUTPATIENT)
Dept: ADMINISTRATIVE | Facility: OTHER | Age: 30
End: 2025-07-08